# Patient Record
Sex: FEMALE | Race: WHITE | NOT HISPANIC OR LATINO | Employment: UNEMPLOYED | URBAN - METROPOLITAN AREA
[De-identification: names, ages, dates, MRNs, and addresses within clinical notes are randomized per-mention and may not be internally consistent; named-entity substitution may affect disease eponyms.]

---

## 2019-08-29 PROBLEM — M48.062 SPINAL STENOSIS OF LUMBAR REGION WITH NEUROGENIC CLAUDICATION: Status: ACTIVE | Noted: 2019-08-29

## 2019-08-29 PROBLEM — H15.003 BILATERAL SCLERITIS: Status: ACTIVE | Noted: 2019-08-29

## 2019-08-29 PROBLEM — M94.8X9 CHONDRITIS: Status: ACTIVE | Noted: 2019-08-29

## 2019-08-29 PROBLEM — I10 ESSENTIAL HYPERTENSION: Status: ACTIVE | Noted: 2019-08-29

## 2022-01-27 PROBLEM — H60.339 SWIMMER'S EAR: Status: ACTIVE | Noted: 2022-01-27

## 2023-04-03 PROBLEM — J02.9 PHARYNGITIS: Status: ACTIVE | Noted: 2023-04-03

## 2023-05-15 PROBLEM — I71.21 ANEURYSM OF ASCENDING AORTA WITHOUT RUPTURE: Status: ACTIVE | Noted: 2023-05-15

## 2023-05-19 ENCOUNTER — TELEPHONE (OUTPATIENT)
Dept: CARDIOTHORACIC SURGERY | Facility: CLINIC | Age: 68
End: 2023-05-19
Payer: MEDICARE

## 2023-05-19 DIAGNOSIS — I71.21 ANEURYSM OF ASCENDING AORTA WITHOUT RUPTURE: Primary | ICD-10-CM

## 2023-05-19 NOTE — TELEPHONE ENCOUNTER
Called pt RE: referral for TAA.  Pt scheduled to see Dr. Gregg on 5/22.  Pt notified of appt time and location, which she verbalized understanding to.      ----- Message from Tere Webb RN sent at 5/19/2023 10:41 AM CDT -----  Regarding: FW: New Patient  Contact: Patient    ----- Message -----  From: Rylie Vila  Sent: 5/19/2023  10:39 AM CDT  To: [facility] Thoracic Surgery Clinical Support  Subject: New Patient                                      A referral was submitted by Dr. Saab for patient to schedule appt with dept   Patient is requesting a call back to schedule as soon as possible   Please Assist       Patient can be reached at [phone]

## 2023-05-22 ENCOUNTER — OFFICE VISIT (OUTPATIENT)
Dept: CARDIOTHORACIC SURGERY | Facility: CLINIC | Age: 68
End: 2023-05-22
Payer: MEDICARE

## 2023-05-22 VITALS
HEART RATE: 69 BPM | DIASTOLIC BLOOD PRESSURE: 71 MMHG | WEIGHT: 164 LBS | HEIGHT: 64 IN | OXYGEN SATURATION: 99 % | BODY MASS INDEX: 28 KG/M2 | SYSTOLIC BLOOD PRESSURE: 116 MMHG

## 2023-05-22 DIAGNOSIS — I71.21 ANEURYSM OF ASCENDING AORTA WITHOUT RUPTURE: ICD-10-CM

## 2023-05-22 PROCEDURE — 99999 PR PBB SHADOW E&M-EST. PATIENT-LVL III: CPT | Mod: PBBFAC,,, | Performed by: THORACIC SURGERY (CARDIOTHORACIC VASCULAR SURGERY)

## 2023-05-22 PROCEDURE — 99205 OFFICE O/P NEW HI 60 MIN: CPT | Mod: S$GLB,,, | Performed by: THORACIC SURGERY (CARDIOTHORACIC VASCULAR SURGERY)

## 2023-05-22 PROCEDURE — 99999 PR PBB SHADOW E&M-EST. PATIENT-LVL III: ICD-10-PCS | Mod: PBBFAC,,, | Performed by: THORACIC SURGERY (CARDIOTHORACIC VASCULAR SURGERY)

## 2023-05-22 PROCEDURE — 99205 PR OFFICE/OUTPT VISIT, NEW, LEVL V, 60-74 MIN: ICD-10-PCS | Mod: S$GLB,,, | Performed by: THORACIC SURGERY (CARDIOTHORACIC VASCULAR SURGERY)

## 2023-05-22 RX ORDER — DIFLUPREDNATE OPHTHALMIC 0.5 MG/ML
1 EMULSION OPHTHALMIC
COMMUNITY

## 2023-05-22 RX ORDER — DORZOLAMIDE HCL 20 MG/ML
SOLUTION/ DROPS OPHTHALMIC
COMMUNITY
Start: 2022-11-30

## 2023-05-22 RX ORDER — BRIMONIDINE TARTRATE AND TIMOLOL MALEATE 2; 5 MG/ML; MG/ML
1 SOLUTION OPHTHALMIC
COMMUNITY

## 2023-05-22 NOTE — PROGRESS NOTES
Subjective:      Patient ID: Ginger Xiong is a 67 y.o. female.    Chief Complaint: No chief complaint on file.      HPI:  Ginger Xiong is a 67 y.o. female who presents for surgical evaluation of TAA. Medical conditions include HTN, recurrent laryngeal nerve paralysis and hoarseness. Patient states that about 3 months ago she developed hoarseness for which she was sent to ENT. Reports the first ENT did a CT scan of her neck and told her everything looked normal. Saw another ENT who imaged her chest and noted the TAA. He suspected that this was compressing her laryngeal nerve causing the hoarseness. Patient reports that her voice today is still hoarse to her. Says that in the morning she can only speak in a whisper but throughout the day it gets stronger. No chest pain, shortness of breath. Her pressure has been elevated for which she was started on medication. No family history of aneurysm that she knows of. No prior strokes, seizures, blood clots, stents, or sternotomies. Quit smoking around 7 years ago. Prior to that smoked for at least 30 years.       Family and social history reviewed    Current Outpatient Medications   Medication Instructions    amitriptyline (ELAVIL) 50 mg, Oral, Nightly    bisoprolol (ZEBETA) 20 mg, Oral, Daily    brimonidine-timoloL (COMBIGAN) 0.2-0.5 % Drop 1 drop, Both Eyes    difluprednate (DUREZOL) 0.05 % Drop ophthalmic solution 1 drop    dorzolamide (TRUSOPT) 2 % ophthalmic solution INSTILL 1 DROP TWICE A DAY IN BOTH EYES    DULoxetine (CYMBALTA) 60 mg, Oral, Daily    estradioL (ESTRACE) 2 mg, Oral, Daily    methocarbamoL (ROBAXIN) 750 mg, Oral, 3 times daily    propranoloL (INDERAL LA) 160 mg, Oral, Daily         Review of patient's allergies indicates:   Allergen Reactions    Aspirin Anaphylaxis     Past Medical History:   Diagnosis Date    Dry eyes     Dry mouth      Past Surgical History:   Procedure Laterality Date    BACK SURGERY      scoliosis; 2000,2007,2011    HYSTERECTOMY       prolapsed uterus    LASIK       Family History       Problem Relation (Age of Onset)    Cancer Mother (49), Father, Sister          Social History     Socioeconomic History    Marital status:     Number of children: 2   Occupational History    Occupation: retired      Employer:  NOT EMPLOYED   Tobacco Use    Smoking status: Former     Packs/day: 1.00     Years: 30.00     Pack years: 30.00     Types: Cigarettes    Smokeless tobacco: Never    Tobacco comments:     quit smoking 2 years ago    Substance and Sexual Activity    Alcohol use: Yes     Comment: rare    Drug use: No       Current medications Reviewed    Review of Systems   Constitutional:  Negative for activity change and fatigue.   HENT:  Positive for voice change.    Eyes:  Negative for visual disturbance.   Respiratory:  Negative for shortness of breath.    Cardiovascular:  Negative for chest pain.   Gastrointestinal:  Negative for nausea.   Musculoskeletal:  Negative for gait problem.   Skin:  Negative for color change.   Neurological:  Negative for seizures and weakness.   Hematological:  Does not bruise/bleed easily.   Psychiatric/Behavioral:  Negative for sleep disturbance.    Objective:   Physical Exam  Constitutional:       General: She is not in acute distress.  HENT:      Head: Normocephalic and atraumatic.   Eyes:      Pupils: Pupils are equal, round, and reactive to light.   Cardiovascular:      Rate and Rhythm: Normal rate.   Pulmonary:      Effort: Pulmonary effort is normal. No respiratory distress.   Abdominal:      General: There is no distension.   Musculoskeletal:         General: Normal range of motion.      Cervical back: Normal range of motion.   Skin:     Coloration: Skin is not pale.   Neurological:      General: No focal deficit present.      Mental Status: She is alert.   Psychiatric:         Mood and Affect: Mood normal.         Behavior: Behavior normal.       Diagnostic Results:   CT reviewed      Assessment:   TAA ~4.2 cm  Plan:   Patient was seen and assessed or ascending aortic aneurysm.  The ascending aorta appears to be between 4.1-4.2 cm.  The concern from the reference position was that this could be contributing to laryngeal dysfunction secondary to nerve compression.  It appears that the ascending aorta is dilated however it is not to a stage where it would be causing any of the symptoms.  Of note, patient was has already improved without doing any surgical or medical intervention at this stage.  I feel that the ascending aorta is a incidental finding and not contributory to the recurrent laryngeal nerve paralysis.  However, we would recommend a detailed ENT and neurology workup to ascertain the real cause of this problem.  If workup shows that ascending aorta is of concern then we will address it at that stage.  Plan was discussed with the patient who stated an understanding of plan.  All questions and concerns were addressed to their satisfaction.

## 2023-06-06 DIAGNOSIS — R49.0 HOARSENESS: Primary | ICD-10-CM

## 2023-06-07 ENCOUNTER — PATIENT MESSAGE (OUTPATIENT)
Dept: OTOLARYNGOLOGY | Facility: CLINIC | Age: 68
End: 2023-06-07
Payer: MEDICARE

## 2023-06-08 ENCOUNTER — TELEPHONE (OUTPATIENT)
Dept: NEUROLOGY | Facility: CLINIC | Age: 68
End: 2023-06-08
Payer: MEDICARE

## 2023-06-08 DIAGNOSIS — R49.0 HOARSENESS: Primary | ICD-10-CM

## 2023-06-08 NOTE — TELEPHONE ENCOUNTER
Called patient to schedule appointment. Patient declined appointment, states [place] is too far for her.

## 2023-06-08 NOTE — TELEPHONE ENCOUNTER
----- Message from Yanira Stovall RN sent at 6/8/2023  2:20 PM CDT -----  Good afternoon,    Dr. Gregg recently saw Mrs. Ginger Xiong for thoracic ascending aortic aneurysm and she c/o hoarseness.  Dr. Gregg has referred Mrs. Xiong to Neurology related to a concern for laryngeal dysfunction secondary to nerve compression.  Can you please contact Mrs. Xiong and schedule her to see a Neurologist for this?    Thank you,    Yanira Stovall RN

## 2023-06-09 ENCOUNTER — TELEPHONE (OUTPATIENT)
Dept: NEUROLOGY | Facility: CLINIC | Age: 68
End: 2023-06-09
Payer: MEDICARE

## 2023-06-09 ENCOUNTER — PATIENT MESSAGE (OUTPATIENT)
Dept: NEUROLOGY | Facility: CLINIC | Age: 68
End: 2023-06-09
Payer: MEDICARE

## 2023-06-16 ENCOUNTER — TELEPHONE (OUTPATIENT)
Dept: NEUROLOGY | Facility: CLINIC | Age: 68
End: 2023-06-16
Payer: COMMERCIAL

## 2023-06-16 ENCOUNTER — PATIENT MESSAGE (OUTPATIENT)
Dept: NEUROLOGY | Facility: CLINIC | Age: 68
End: 2023-06-16
Payer: COMMERCIAL

## 2023-06-16 NOTE — TELEPHONE ENCOUNTER
----- Message from Yanira Stovall RN sent at 6/13/2023  8:34 AM CDT -----  Seun Menendez,    Just out of curiosity: Mrs. Xiong is coming to the [facility] next Tuesday (June 20) to see Dr. Waggoner in ENT.  Are there any available appts for her to see a provider in Neurology at [facility] that day?    Thank you,    Yanira  ----- Message -----  From: Gemma Mcwilliams RN  Sent: 6/9/2023   2:44 PM CDT  To: Yanira Stovall RN    [facility] [place] [facility] but she declined NS appt. Not sure if she will come to [facility]  ----- Message -----  From: Yanira Stovall RN  Sent: 6/8/2023   2:35 PM CDT  To: [facility] Neurology Clinical Support, #    Good afternoon,    Dr. Gregg recently saw Mrs. Ginger Xiong for thoracic ascending aortic aneurysm and she c/o hoarseness.  Dr. Gregg has referred Mrs. Xiong to Neurology related to a concern for laryngeal dysfunction secondary to nerve compression.  Can you please contact Mrs. Xiong and schedule her to see a Neurologist for this?    Thank you,    Yanira Stovall RN

## 2023-06-16 NOTE — TELEPHONE ENCOUNTER
LVM for the patient. Offered next available appointment Tuesday after ENT appt at 2 pm with Dr Caruso. Left direct contact information and sent portal message as well.

## 2023-06-19 ENCOUNTER — TELEPHONE (OUTPATIENT)
Dept: CARDIOTHORACIC SURGERY | Facility: CLINIC | Age: 68
End: 2023-06-19
Payer: COMMERCIAL

## 2023-06-19 NOTE — TELEPHONE ENCOUNTER
Called and confirmed pt is aware of ENT and Neurology appts scheduled for tomorrow for hoarseness, which pt was referred by Dr. Gregg.  Pt informed of appt times and locations, which she verbalized understanding to.

## 2023-06-20 ENCOUNTER — LAB VISIT (OUTPATIENT)
Dept: LAB | Facility: HOSPITAL | Age: 68
End: 2023-06-20
Attending: PSYCHIATRY & NEUROLOGY
Payer: COMMERCIAL

## 2023-06-20 ENCOUNTER — OFFICE VISIT (OUTPATIENT)
Dept: OTOLARYNGOLOGY | Facility: CLINIC | Age: 68
End: 2023-06-20
Payer: COMMERCIAL

## 2023-06-20 ENCOUNTER — OFFICE VISIT (OUTPATIENT)
Dept: NEUROLOGY | Facility: CLINIC | Age: 68
End: 2023-06-20
Payer: COMMERCIAL

## 2023-06-20 VITALS
SYSTOLIC BLOOD PRESSURE: 119 MMHG | HEART RATE: 64 BPM | BODY MASS INDEX: 28.24 KG/M2 | DIASTOLIC BLOOD PRESSURE: 79 MMHG | WEIGHT: 165.44 LBS | HEIGHT: 64 IN

## 2023-06-20 VITALS
DIASTOLIC BLOOD PRESSURE: 79 MMHG | HEART RATE: 75 BPM | HEIGHT: 64 IN | BODY MASS INDEX: 28.12 KG/M2 | SYSTOLIC BLOOD PRESSURE: 116 MMHG | WEIGHT: 164.69 LBS

## 2023-06-20 DIAGNOSIS — R49.0 HOARSENESS: ICD-10-CM

## 2023-06-20 DIAGNOSIS — G62.9 POLYNEUROPATHY: ICD-10-CM

## 2023-06-20 DIAGNOSIS — G62.9 POLYNEUROPATHY: Primary | ICD-10-CM

## 2023-06-20 LAB
CRP SERPL-MCNC: 83 MG/L (ref 0–8.2)
ERYTHROCYTE [SEDIMENTATION RATE] IN BLOOD BY PHOTOMETRIC METHOD: 30 MM/HR (ref 0–36)
ESTIMATED AVG GLUCOSE: 123 MG/DL (ref 68–131)
HBA1C MFR BLD: 5.9 % (ref 4–5.6)

## 2023-06-20 PROCEDURE — 83921 ORGANIC ACID SINGLE QUANT: CPT | Performed by: PSYCHIATRY & NEUROLOGY

## 2023-06-20 PROCEDURE — 3008F BODY MASS INDEX DOCD: CPT | Mod: CPTII,S$GLB,, | Performed by: PSYCHIATRY & NEUROLOGY

## 2023-06-20 PROCEDURE — 3078F PR MOST RECENT DIASTOLIC BLOOD PRESSURE < 80 MM HG: ICD-10-PCS | Mod: CPTII,S$GLB,, | Performed by: PSYCHIATRY & NEUROLOGY

## 2023-06-20 PROCEDURE — 3288F FALL RISK ASSESSMENT DOCD: CPT | Mod: CPTII,S$GLB,, | Performed by: PSYCHIATRY & NEUROLOGY

## 2023-06-20 PROCEDURE — 3074F PR MOST RECENT SYSTOLIC BLOOD PRESSURE < 130 MM HG: ICD-10-PCS | Mod: CPTII,S$GLB,, | Performed by: OTOLARYNGOLOGY

## 2023-06-20 PROCEDURE — 3074F SYST BP LT 130 MM HG: CPT | Mod: CPTII,S$GLB,, | Performed by: PSYCHIATRY & NEUROLOGY

## 2023-06-20 PROCEDURE — 1126F AMNT PAIN NOTED NONE PRSNT: CPT | Mod: CPTII,S$GLB,, | Performed by: OTOLARYNGOLOGY

## 2023-06-20 PROCEDURE — 3008F PR BODY MASS INDEX (BMI) DOCUMENTED: ICD-10-PCS | Mod: CPTII,S$GLB,, | Performed by: OTOLARYNGOLOGY

## 2023-06-20 PROCEDURE — 1101F PT FALLS ASSESS-DOCD LE1/YR: CPT | Mod: CPTII,S$GLB,, | Performed by: PSYCHIATRY & NEUROLOGY

## 2023-06-20 PROCEDURE — 99205 OFFICE O/P NEW HI 60 MIN: CPT | Mod: S$GLB,,, | Performed by: PSYCHIATRY & NEUROLOGY

## 2023-06-20 PROCEDURE — 99999 PR PBB SHADOW E&M-EST. PATIENT-LVL IV: ICD-10-PCS | Mod: PBBFAC,,, | Performed by: PSYCHIATRY & NEUROLOGY

## 2023-06-20 PROCEDURE — 86140 C-REACTIVE PROTEIN: CPT | Performed by: PSYCHIATRY & NEUROLOGY

## 2023-06-20 PROCEDURE — 31575 DIAGNOSTIC LARYNGOSCOPY: CPT | Mod: S$GLB,,, | Performed by: OTOLARYNGOLOGY

## 2023-06-20 PROCEDURE — 1125F PR PAIN SEVERITY QUANTIFIED, PAIN PRESENT: ICD-10-PCS | Mod: CPTII,S$GLB,, | Performed by: PSYCHIATRY & NEUROLOGY

## 2023-06-20 PROCEDURE — 3074F PR MOST RECENT SYSTOLIC BLOOD PRESSURE < 130 MM HG: ICD-10-PCS | Mod: CPTII,S$GLB,, | Performed by: PSYCHIATRY & NEUROLOGY

## 2023-06-20 PROCEDURE — 85652 RBC SED RATE AUTOMATED: CPT | Performed by: PSYCHIATRY & NEUROLOGY

## 2023-06-20 PROCEDURE — 1160F RVW MEDS BY RX/DR IN RCRD: CPT | Mod: CPTII,S$GLB,, | Performed by: PSYCHIATRY & NEUROLOGY

## 2023-06-20 PROCEDURE — 3044F PR MOST RECENT HEMOGLOBIN A1C LEVEL <7.0%: ICD-10-PCS | Mod: CPTII,S$GLB,, | Performed by: OTOLARYNGOLOGY

## 2023-06-20 PROCEDURE — 99204 OFFICE O/P NEW MOD 45 MIN: CPT | Mod: 25,S$GLB,, | Performed by: OTOLARYNGOLOGY

## 2023-06-20 PROCEDURE — 99999 PR PBB SHADOW E&M-EST. PATIENT-LVL IV: CPT | Mod: PBBFAC,,, | Performed by: PSYCHIATRY & NEUROLOGY

## 2023-06-20 PROCEDURE — 3074F SYST BP LT 130 MM HG: CPT | Mod: CPTII,S$GLB,, | Performed by: OTOLARYNGOLOGY

## 2023-06-20 PROCEDURE — 3008F PR BODY MASS INDEX (BMI) DOCUMENTED: ICD-10-PCS | Mod: CPTII,S$GLB,, | Performed by: PSYCHIATRY & NEUROLOGY

## 2023-06-20 PROCEDURE — 1126F PR PAIN SEVERITY QUANTIFIED, NO PAIN PRESENT: ICD-10-PCS | Mod: CPTII,S$GLB,, | Performed by: OTOLARYNGOLOGY

## 2023-06-20 PROCEDURE — 99999 PR PBB SHADOW E&M-EST. PATIENT-LVL IV: CPT | Mod: PBBFAC,,, | Performed by: OTOLARYNGOLOGY

## 2023-06-20 PROCEDURE — 99205 PR OFFICE/OUTPT VISIT, NEW, LEVL V, 60-74 MIN: ICD-10-PCS | Mod: S$GLB,,, | Performed by: PSYCHIATRY & NEUROLOGY

## 2023-06-20 PROCEDURE — 1101F PR PT FALLS ASSESS DOC 0-1 FALLS W/OUT INJ PAST YR: ICD-10-PCS | Mod: CPTII,S$GLB,, | Performed by: PSYCHIATRY & NEUROLOGY

## 2023-06-20 PROCEDURE — 82607 VITAMIN B-12: CPT | Performed by: PSYCHIATRY & NEUROLOGY

## 2023-06-20 PROCEDURE — 31575 PR LARYNGOSCOPY, FLEXIBLE; DIAGNOSTIC: ICD-10-PCS | Mod: S$GLB,,, | Performed by: OTOLARYNGOLOGY

## 2023-06-20 PROCEDURE — 99204 PR OFFICE/OUTPT VISIT, NEW, LEVL IV, 45-59 MIN: ICD-10-PCS | Mod: 25,S$GLB,, | Performed by: OTOLARYNGOLOGY

## 2023-06-20 PROCEDURE — 36415 COLL VENOUS BLD VENIPUNCTURE: CPT | Performed by: PSYCHIATRY & NEUROLOGY

## 2023-06-20 PROCEDURE — 83036 HEMOGLOBIN GLYCOSYLATED A1C: CPT | Performed by: PSYCHIATRY & NEUROLOGY

## 2023-06-20 PROCEDURE — 1125F AMNT PAIN NOTED PAIN PRSNT: CPT | Mod: CPTII,S$GLB,, | Performed by: PSYCHIATRY & NEUROLOGY

## 2023-06-20 PROCEDURE — 3078F PR MOST RECENT DIASTOLIC BLOOD PRESSURE < 80 MM HG: ICD-10-PCS | Mod: CPTII,S$GLB,, | Performed by: OTOLARYNGOLOGY

## 2023-06-20 PROCEDURE — 1160F PR REVIEW ALL MEDS BY PRESCRIBER/CLIN PHARMACIST DOCUMENTED: ICD-10-PCS | Mod: CPTII,S$GLB,, | Performed by: PSYCHIATRY & NEUROLOGY

## 2023-06-20 PROCEDURE — 3288F PR FALLS RISK ASSESSMENT DOCUMENTED: ICD-10-PCS | Mod: CPTII,S$GLB,, | Performed by: PSYCHIATRY & NEUROLOGY

## 2023-06-20 PROCEDURE — 3008F BODY MASS INDEX DOCD: CPT | Mod: CPTII,S$GLB,, | Performed by: OTOLARYNGOLOGY

## 2023-06-20 PROCEDURE — 3078F DIAST BP <80 MM HG: CPT | Mod: CPTII,S$GLB,, | Performed by: OTOLARYNGOLOGY

## 2023-06-20 PROCEDURE — 86038 ANTINUCLEAR ANTIBODIES: CPT | Performed by: PSYCHIATRY & NEUROLOGY

## 2023-06-20 PROCEDURE — 99999 PR PBB SHADOW E&M-EST. PATIENT-LVL IV: ICD-10-PCS | Mod: PBBFAC,,, | Performed by: OTOLARYNGOLOGY

## 2023-06-20 PROCEDURE — 1159F PR MEDICATION LIST DOCUMENTED IN MEDICAL RECORD: ICD-10-PCS | Mod: CPTII,S$GLB,, | Performed by: PSYCHIATRY & NEUROLOGY

## 2023-06-20 PROCEDURE — 3078F DIAST BP <80 MM HG: CPT | Mod: CPTII,S$GLB,, | Performed by: PSYCHIATRY & NEUROLOGY

## 2023-06-20 PROCEDURE — 3044F HG A1C LEVEL LT 7.0%: CPT | Mod: CPTII,S$GLB,, | Performed by: OTOLARYNGOLOGY

## 2023-06-20 PROCEDURE — 1159F MED LIST DOCD IN RCRD: CPT | Mod: CPTII,S$GLB,, | Performed by: PSYCHIATRY & NEUROLOGY

## 2023-06-20 RX ORDER — PREDNISOLONE SODIUM PHOSPHATE 10 MG/ML
1 SOLUTION/ DROPS OPHTHALMIC
COMMUNITY
Start: 2021-08-18

## 2023-06-20 RX ORDER — LOTEPREDNOL ETABONATE 5 MG/ML
1 SUSPENSION/ DROPS OPHTHALMIC
COMMUNITY
Start: 2023-06-13

## 2023-06-20 RX ORDER — MONTELUKAST SODIUM 10 MG/1
10 TABLET ORAL
COMMUNITY
Start: 2023-06-13 | End: 2023-07-17 | Stop reason: SDUPTHER

## 2023-06-20 NOTE — PROGRESS NOTES
[facility]    Subjective:       Patient ID: Ginger Xiong is a 68 y.o. female.    Chief Complaint: Hoarse    HPI    Ginger Xiong is a 68 y.o. female who was referred by Dr. 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 He had met her recently to evaluate a recently discovered, small thoracic aortic aneurysm. She reports sudden and complete loss of her voice on March 10 in the setting of neck pain. She improved to a whisper voice, but experienced a return to no voice. She visited with Dr. Nunn who noted a left vocal fold hypomobility and obtained a CT of her neck that was normal. When she did not get better, she met with Dr. Rivas, who informed her that the other vocal fold was weak. So he obtained a CT of the chest that identified the TAA.  With the TAA in the setting of a higher than normal HR and BP, she was referred to Dr. Gregg. The TAA was noted to be small and below the threshold of what has been previously associated with RLN issues, but he requested an evaluation and perhaps a tie breaker, given the differing assessments or remembrances of which TVF had a mobility issue.     Today, she reports that her voice is basically back to normal. She has no dysphagia, odynophagia, aspiration, recent pneumonias, coughing with eating and drinking, and ear pain. She is breathing well. She denies URI symptoms or sick contacts at the time of her symptom onset.    She did have an ACDF back in 2011, and she notes that she did not experience voice changes then. 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     She presents for evaluation and management.     Past Medical History:   Diagnosis Date    Dry eyes     Dry mouth      Past Surgical History:   Procedure Laterality Date    BACK SURGERY      scoliosis; 2000,2007,2011    HYSTERECTOMY      prolapsed uterus    LASIK           Current Outpatient Medications:     amitriptyline (ELAVIL) 50 MG  tablet, Take 1 tablet (50 mg total) by mouth every evening., Disp: 90 tablet, Rfl: 3    bisoprolol (ZEBETA) 10 MG tablet, Take 2 tablets (20 mg total) by mouth once daily., Disp: 30 tablet, Rfl: 11    brimonidine-timoloL (COMBIGAN) 0.2-0.5 % Drop, Place 1 drop into both eyes., Disp: , Rfl:     difluprednate (DUREZOL) 0.05 % Drop ophthalmic solution, 1 drop., Disp: , Rfl:     dorzolamide (TRUSOPT) 2 % ophthalmic solution, INSTILL 1 DROP TWICE A DAY IN BOTH EYES, Disp: , Rfl:     DULoxetine (CYMBALTA) 60 MG capsule, Take 1 capsule (60 mg total) by mouth once daily. (Patient not taking: Reported on 6/20/2023), Disp: 90 capsule, Rfl: 2    estradioL (ESTRACE) 2 MG tablet, Take 1 tablet (2 mg total) by mouth once daily., Disp: 30 tablet, Rfl: 4    loteprednol (LOTEMAX) 0.5 % ophthalmic suspension, Place 1 drop into both eyes., Disp: , Rfl:     methocarbamoL (ROBAXIN) 750 MG Tab, Take 1 tablet (750 mg total) by mouth 3 (three) times daily., Disp: 90 tablet, Rfl: 2    montelukast (SINGULAIR) 10 mg tablet, Take 10 mg by mouth., Disp: , Rfl:     prednisoLONE sodium phosphate (INFLAMASE FORTE) 1 % Drop, 1 drop., Disp: , Rfl:     propranoloL (INDERAL LA) 160 mg 24 hr capsule, Take 1 capsule (160 mg total) by mouth once daily., Disp: 90 capsule, Rfl: 3    Review of patient's allergies indicates:   Allergen Reactions    Aspirin Anaphylaxis    Imitrex [sumatriptan] Palpitations and Other (See Comments)     Severe pain and palpitations       Social History     Socioeconomic History    Marital status:     Number of children: 2   Occupational History    Occupation: retired      Employer:  NOT EMPLOYED   Tobacco Use    Smoking status: Former     Packs/day: 1.00     Years: 30.00     Pack years: 30.00     Types: Cigarettes    Smokeless tobacco: Never    Tobacco comments:     quit smoking 2 years ago    Substance and Sexual Activity    Alcohol use: Yes     Comment: rare    Drug use: No       Family History   Problem  Relation Age of Onset    Cancer Mother 49        pancreatic    Cancer Father         lung    Cancer Sister         breast       Review of Systems   Constitutional: Negative.    HENT:  Positive for voice change.    Respiratory: Negative.     Cardiovascular: Negative.    Gastrointestinal: Negative.    Endocrine: Negative.    Genitourinary: Negative.    Musculoskeletal:  Positive for back pain and neck pain.   Skin: Negative.    Allergic/Immunologic: Negative.    Neurological:  Positive for headaches.   Hematological:  Bruises/bleeds easily.   Psychiatric/Behavioral: Negative.         Answers submitted by the patient for this visit:  Review of Symptoms Questionnaire  (Submitted on 6/18/2023)    Objective:     Physical Exam  Vitals reviewed.   Constitutional:       General: She is not in acute distress.     Appearance: She is well-developed.   HENT:      Head: Normocephalic and atraumatic.      Jaw: No trismus.      Salivary Glands: Right salivary gland is not diffusely enlarged or tender. Left salivary gland is not diffusely enlarged or tender.      Comments: Salivary glands - there are no lesions or asymmetric findings in the submandibular or parotid glands     Right Ear: Hearing, tympanic membrane, ear canal and external ear normal.      Left Ear: Hearing, tympanic membrane, ear canal and external ear normal.      Nose: No nasal deformity or rhinorrhea.      Mouth/Throat:      Mouth: No oral lesions.      Tongue: No lesions.      Palate: No mass and lesions.      Pharynx: Uvula midline.      Comments: Procedure: Flexible laryngoscopy  In order to fully examine the upper aerodigestive tract, including the larynx, in a patient with a hyperactive gag reflex, flexible endoscopy is required.  After explaining the procedure and obtaining verbal consent, a timeout was performed with the patient's participation according to the universal protocol. Both nasal cavities were anesthetized with 4% Xylocaine spray mixed with  Arturo-Synephrine. The flexible laryngoscope (#[id number]) was inserted into the nasal cavity and advanced to visualize the nasal cavity, nasopharynx, the posterior oropharynx, hypopharynx, and the endolarynx with the above findings noted. The scope was removed and the procedure terminated. The patient tolerated this procedure well without apparent complication.      FINDINGS  Nasopharynx - the torus is clear. There are no lesions of the posterior wall.   Oropharynx - no lesions of the tongue base. There is no obvious fullness or asymmetry.  Hypopharynx - there are no lesions of the pyriform sinuses or postcricoid region   Larynx - there are no lesions of the supraglottic or glottic larynx. Vocal fold mobility is normal with complete closure. There is no vocal fold mobility issue     Eyes:      General: Lids are normal.      Extraocular Movements: Extraocular movements intact.      Conjunctiva/sclera:      Right eye: Right conjunctiva is not injected. No chemosis.     Left eye: Left conjunctiva is not injected. No chemosis.  Neck:      Thyroid: No thyroid mass or thyromegaly.      Vascular: No JVD.      Trachea: Phonation normal. No tracheal deviation.   Pulmonary:      Effort: Pulmonary effort is normal. No accessory muscle usage or respiratory distress.      Breath sounds: No stridor.   Musculoskeletal:         General: No tenderness.      Cervical back: Full passive range of motion without pain.   Lymphadenopathy:      Cervical: No cervical adenopathy.      Right cervical: No deep or posterior cervical adenopathy.     Left cervical: No deep or posterior cervical adenopathy.   Skin:     Findings: No erythema, lesion or rash.      Nails: There is no clubbing.   Neurological:      Mental Status: She is alert and oriented to person, place, and time.      Cranial Nerves: No cranial nerve deficit or facial asymmetry.      Motor: No weakness.      Gait: Gait normal.   Psychiatric:         Speech: Speech normal.          Behavior: Behavior is cooperative.              Assessment & Plan:       Problem List Items Addressed This Visit    None  Visit Diagnoses       Hoarseness        Her vocal fold mobility is now normal. There is no paresis/paralysis, and there are no lesions. Reassurance was provided. FU PRN        We had a discussion that it is quite possible that she had an idiopathic vocal fold paralysis at her initial presentation, and it was improving when she met with the next team. But it has resolved now. I would not expect a vocal fold immobility issue that was related to a TAA to improve. She may return to see me as needed.

## 2023-06-20 NOTE — PROGRESS NOTES
[facility] - NEUROLOGY 7TH [place] [facility], [place]    Date: 6/20/23  Patient Name: Ginger Xiong   MRN: [id number]   Referring Provider: Donovan Gregg MD    Thank you so much Donovan Gregg MD for your patient referral to Neuromuscular team at [facility]. We take pride in our care coordination and look forward to your feedback and questions.    Assessment:   Ginger Xiong is a 68 y.o. female is a very pleasant patient with hoarseness of voice with evidence of left vocal cord and laryngeal muscle edema which progressively improved to almost her baseline.  I wonder if she had some viral infection related laryngitis.  The differential may include vasculitic event in the setting of autoimmune problem.    She additionally has evidence of polyneuropathy I will complete the workup for polyneuropathy.    I discussed about fall precautions and need for Physiotherapy. I addressed her complaints. I provided information about fall precautions and healthy lifestyle.    I would wish her very best for improvement/recovery in her condition.    Future direction based on feedback:    Plan:     Problem List Items Addressed This Visit    None  Visit Diagnoses       Polyneuropathy    -  Primary    Relevant Orders    Vitamin B12 Deficiency Panel    HEMOGLOBIN A1C    Sedimentation rate    C-REACTIVE PROTEIN    LOPEZ by IFA, w/Rflx    Hoarseness        Relevant Orders    HEMOGLOBIN A1C    Sedimentation rate    C-REACTIVE PROTEIN    LOPEZ by IFA, w/Rflx            Toy Caruso MD    This evaluation was completed in >60  Minutes over 50% of the time spent on education & counseling. This includes face to face time and non-face to face time preparing to see the patient (eg, review of tests), obtaining and/or reviewing separately obtained history, documenting clinical information in the electronic or other health record, independently interpreting results and communicating results to the  patient/family/caregiver, or care coordinator.    Patient note was created using MModal Dictation.  Any errors in syntax or even information may not have been identified and edited on initial review prior to signing this note.    Details provided by:    Patient  Family- [place] ()    Chief complaint today:  Hoarseness of voice    History of Present Illness:      Ms. Ginger Xiong is a 68 y.o. female presenting for evaluation of  Hoarseness of voice.  She has PMH of HTN, aneurysm of ascending aorta.    She noticed hoarseness of voice for more than 3 months.  It peaked after 6 weeks of its onset to the point that she was not having any voice at all. Since the peak it started improving gradually but there was some fluctuation and there were some good days and some bad days.  She was tested for COVID twice during that time but result was negative.  Currently she feels she is 90 percent back to her usual self.  She did not notice any other symptoms of swallowing or breathing difficulty.      She noted vibration like sensation in legs for past 12 years.  She had 3 back surgeries in the past.  There is no history of numbness in lower extremities. She develop pneumonia in 2019 in early COVID.      There was concern for autoimmune disorder in the past and she is currently following with a rheumatologist Dr. Alexander in [place].  She has noted the symptoms for past 4 years mainly concerning her eyes and she has been tried on methotrexate, IVIG, Humira, Remicade for probable lupus.   She is currently retired but worked as  in the past.  There is no history of smoking and she rarely drinks alcohol.    Chart Review:  She was seen by ENT on 3rd April 2023 and there was concern for partial vocal cord paralysis.  Fiberoptic flexible laryngoscopy was done with sluggish left vocal cord adduction, arytenoid edema, arytenoid erythema and interarytenoid edema.    Pertinent work up based on chart review for current  condition:  CT scan of soft tissue neck done on 04/18/2023.  No evidence of mass lesion, adenopathy or any specific abnormality to explain patient's symptoms.  Serum protein electrophoresis normal   C-reactive protein 7.3   Creatinine 0.8.    LFTs normal   TSH 2.5   CBC with WBC 9.0     Last CBC Results:   Lab Results   Component Value Date    WBC 5.92 08/18/2014    HGB 13.5 08/18/2014    HCT 40.0 08/18/2014     08/18/2014       Last CMP Results  Lab Results   Component Value Date     08/18/2014    K 4.5 08/18/2014     08/18/2014    CO2 28 08/18/2014    BUN 11 05/10/2023    CREATININE 0.9 08/18/2014    CALCIUM 9.4 08/18/2014    ALBUMIN 4.0 08/18/2014    AST 37 08/18/2014    ALT 16 08/18/2014       Last Lipids  No results found for: CHOL, TRIG, HDL, LDLCALC    Last A1C  No results found for: HGBA1C    Last TSH  No results found for: TSH    MRI 06/20/2023    Review of Systems:  12 system review of systems is negative except for the symptoms mentioned in HPI.       PAST MEDICAL HISTORY:  Past Medical History:   Diagnosis Date    Dry eyes     Dry mouth        PAST SURGICAL HISTORY:  Past Surgical History:   Procedure Laterality Date    BACK SURGERY      scoliosis; 2000,2007,2011    HYSTERECTOMY      prolapsed uterus    LASIK         CURRENT MEDS:  I have reconciled the patient's home medications and discharge medications with the patient/family. I have updated all changes.  Refer to After-Visit Medication List.    Current Outpatient Medications   Medication Sig Dispense Refill    amitriptyline (ELAVIL) 50 MG tablet Take 1 tablet (50 mg total) by mouth every evening. 90 tablet 3    bisoprolol (ZEBETA) 10 MG tablet Take 2 tablets (20 mg total) by mouth once daily. 30 tablet 11    brimonidine-timoloL (COMBIGAN) 0.2-0.5 % Drop Place 1 drop into both eyes.      difluprednate (DUREZOL) 0.05 % Drop ophthalmic solution 1 drop.      dorzolamide (TRUSOPT) 2 % ophthalmic solution INSTILL 1 DROP TWICE A DAY IN  "BOTH EYES      estradioL (ESTRACE) 2 MG tablet Take 1 tablet (2 mg total) by mouth once daily. 30 tablet 4    loteprednol (LOTEMAX) 0.5 % ophthalmic suspension Place 1 drop into both eyes.      methocarbamoL (ROBAXIN) 750 MG Tab Take 1 tablet (750 mg total) by mouth 3 (three) times daily. 90 tablet 2    montelukast (SINGULAIR) 10 mg tablet Take 10 mg by mouth.      prednisoLONE sodium phosphate (INFLAMASE FORTE) 1 % Drop 1 drop.      propranoloL (INDERAL [place]) 160 mg 24 hr capsule Take 1 capsule (160 mg total) by mouth once daily. 90 capsule 3    DULoxetine (CYMBALTA) 60 MG capsule Take 1 capsule (60 mg total) by mouth once daily. (Patient not taking: Reported on 6/20/2023) 90 capsule 2     No current facility-administered medications for this visit.       ALLERGIES:  Review of patient's allergies indicates:   Allergen Reactions    Aspirin Anaphylaxis    Imitrex [sumatriptan] Palpitations and Other (See Comments)     Severe pain and palpitations       FAMILY HISTORY:  Family History   Problem Relation Age of Onset    Cancer Mother 49        pancreatic    Cancer Father         lung    Cancer Sister         breast       SOCIAL HISTORY:  Social History     Tobacco Use    Smoking status: Former     Packs/day: 1.00     Years: 30.00     Pack years: 30.00     Types: Cigarettes    Smokeless tobacco: Never    Tobacco comments:     quit smoking 2 years ago    Substance Use Topics    Alcohol use: Yes     Comment: rare    Drug use: No         Objective:     Vitals:    06/20/23 1408   BP: 119/79   Pulse: 64   Weight: 75 kg (165 lb 7.3 oz)   Height: 5' 4" (1.626 m)     Wt Readings from Last 3 Encounters:   06/20/23 1408 75 kg (165 lb 7.3 oz)   06/20/23 1027 74.7 kg (164 lb 10.9 oz)   05/22/23 1037 74.4 kg (164 lb)     Body mass index is 28.4 kg/m².       Eyes: no tearing, discharge, no erythema   ENT: moist mucous membranes of the oral cavity, nares patent    Neck: Supple, full range of motion  Cardiovascular: Warm and well " perfused, pulses equal and symmetrical  Lungs: Normal work of breathing, normal chest wall excursions  Skin: No rash, lesions, or breakdown on exposed skin  Psychiatry: Mood and affect are appropriate   Extremeties: No cyanosis, clubbing or edema.    GENERAL/CONSTITUTIONAL:    -Well appearing; well nourished  -her toes are discolored mainly pinkish but she said it can become purple for past 1 year.    HIGHER INTEGRATIVE FUNCTIONS:   -Attention & concentration: Normal   -Orientation: Oriented to person, place & time  -Memory: Normal  -Language: Normal   -Fund of Knowledge: Normal     CRANIAL NERVES:   -CN 2: Visual fields full  -CN 2,3: PERRL  -CN 3,4,6: EOMI  -CN 5: Facial sensation intact bilaterally  -CN 7: Facial strength/movement intact bilaterally  -CN 8: Hearing normal bilaterally  -CN 9,10: Palate elevates symmetrically  -CN 11: Normal shoulder shrug and head turn  -CN 12: Tongue protrudes midline     MOTOR:   -Tone: normal in upper and lower extremities  -UE/LE motor: 5/5 throughout, no pronator drift     SENSATION:   -Her vibration sense is impaired up to ankles.    -Pinprick is impaired up to knees bilaterally.    REFLEXES:   -2/4 upper and lower extremities bilaterally  -Flexor plantar reflex bilaterally    COORDINATION:   -FNF normal bilaterally    GAIT:   -Normal casual gait. Able to walk on heels and toes without difficulty.    Scheduled Follow-up :  Future Appointments   Date Time Provider Department Center   6/20/2023  3:00 PM LAB, APPOINTMENT [facility]       After Visit Medication List :     Medication List            Accurate as of June 20, 2023  2:53 PM. If you have any questions, ask your nurse or doctor.                CONTINUE taking these medications      amitriptyline 50 MG tablet  Commonly known as: ELAVIL  Take 1 tablet (50 mg total) by mouth every evening.     bisoprolol 10 MG tablet  Commonly known as: ZEBETA  Take 2 tablets (20 mg total) by mouth once daily.      brimonidine-timoloL 0.2-0.5 % Drop  Commonly known as: COMBIGAN     difluprednate 0.05 % Drop ophthalmic solution  Commonly known as: DUREZOL     dorzolamide 2 % ophthalmic solution  Commonly known as: TRUSOPT     DULoxetine 60 MG capsule  Commonly known as: CYMBALTA  Take 1 capsule (60 mg total) by mouth once daily.     estradioL 2 MG tablet  Commonly known as: ESTRACE  Take 1 tablet (2 mg total) by mouth once daily.     loteprednol 0.5 % ophthalmic suspension  Commonly known as: LOTEMAX     methocarbamoL 750 MG Tab  Commonly known as: ROBAXIN  Take 1 tablet (750 mg total) by mouth 3 (three) times daily.     montelukast 10 mg tablet  Commonly known as: SINGULAIR     prednisoLONE sodium phosphate 1 % Drop  Commonly known as: INFLAMASE FORTE     propranoloL 160 mg 24 hr capsule  Commonly known as: INDERAL [place]  Take 1 capsule (160 mg total) by mouth once daily.              Signing Physician:          Toy Caruso MD  , [employer] [facility] [employer]  Neurology Consultant. [facility].   [address] [address]th [place] [address].

## 2023-06-21 LAB
ANA SER-ACNC: NORMAL
VIT B12 SERPL-MCNC: 281 NG/L (ref 180–914)

## 2023-06-23 LAB — METHYLMALONATE SERPL-SCNC: 0.18 NMOL/ML

## 2023-06-26 ENCOUNTER — PATIENT MESSAGE (OUTPATIENT)
Dept: NEUROLOGY | Facility: CLINIC | Age: 68
End: 2023-06-26
Payer: COMMERCIAL

## 2024-05-01 ENCOUNTER — TELEPHONE (OUTPATIENT)
Dept: CARDIOTHORACIC SURGERY | Facility: CLINIC | Age: 69
End: 2024-05-01
Payer: COMMERCIAL

## 2024-05-01 DIAGNOSIS — I71.21 ANEURYSM OF ASCENDING AORTA WITHOUT RUPTURE: Primary | ICD-10-CM

## 2024-05-01 NOTE — TELEPHONE ENCOUNTER
Called pt and left brief VM to give a call back to set up yearly apt with Dr. Gregg.  Portal message was also sent to pt . Contact no was provided.

## 2024-05-02 ENCOUNTER — TELEPHONE (OUTPATIENT)
Dept: CARDIOTHORACIC SURGERY | Facility: CLINIC | Age: 69
End: 2024-05-02
Payer: COMMERCIAL

## 2024-06-06 NOTE — PROGRESS NOTES
"Subjective:      Patient ID: Ginger Xiong is a 68 y.o. female.    Chief Complaint: No chief complaint on file.      HPI:  Ginger Xiong is a 68 y.o. female who presents as a one year follow up for TAA surveillance.  She has a medical history significant for HTN, recurrent laryngeal nerve paralysis and hoarseness. She was last seen in May of 2023 by Dr. Gregg.  At that time, her TAA measured 4.1-4.2 cm.  Per his note"Patient was seen and assessed or ascending aortic aneurysm. The ascending aorta appears to be between 4.1-4.2 cm. The concern from the reference position was that this could be contributing to laryngeal dysfunction secondary to nerve compression. It appears that the ascending aorta is dilated however it is not to a stage where it would be causing any of the symptoms. Of note, patient was has already improved without doing any surgical or medical intervention at this stage. I feel that the ascending aorta is a incidental finding and not contributory to the recurrent laryngeal nerve paralysis. However, we would recommend a detailed ENT and neurology workup to ascertain the real cause of this problem. If workup shows that ascending aorta is of concern then we will address it at that stage."   Today, she reports no changes since her last visit.  She does report good control over her blood pressure at home.  No smoking.       Current Outpatient Medications:     amitriptyline (ELAVIL) 50 MG tablet, Take 1 tablet (50 mg total) by mouth every evening., Disp: 90 tablet, Rfl: 3    bisoprolol (ZEBETA) 10 MG tablet, Take 2 tablets (20 mg total) by mouth once daily., Disp: 180 tablet, Rfl: 11    brimonidine-timoloL (COMBIGAN) 0.2-0.5 % Drop, Place 1 drop into both eyes., Disp: , Rfl:     difluprednate (DUREZOL) 0.05 % Drop ophthalmic solution, 1 drop., Disp: , Rfl:     dorzolamide (TRUSOPT) 2 % ophthalmic solution, INSTILL 1 DROP TWICE A DAY IN BOTH EYES, Disp: , Rfl:     DULoxetine (CYMBALTA) 60 MG capsule, Take " 1 capsule (60 mg total) by mouth once daily., Disp: 90 capsule, Rfl: 2    estradioL (ESTRACE) 2 MG tablet, Take 1 tablet (2 mg total) by mouth once daily., Disp: 90 tablet, Rfl: 0    loteprednol (LOTEMAX) 0.5 % ophthalmic suspension, Place 1 drop into both eyes., Disp: , Rfl:     methocarbamoL (ROBAXIN) 750 MG Tab, Take 1 tablet (750 mg total) by mouth 3 (three) times daily., Disp: 90 tablet, Rfl: 2    montelukast (SINGULAIR) 10 mg tablet, Take 1 tablet (10 mg total) by mouth once daily., Disp: 30 tablet, Rfl: 2    prednisoLONE sodium phosphate (INFLAMASE FORTE) 1 % Drop, 1 drop., Disp: , Rfl:     propranoloL (INDERAL [place]) 160 mg 24 hr capsule, Take 1 capsule (160 mg total) by mouth once daily., Disp: 90 capsule, Rfl: 3    tretinoin (RETIN-A) 0.1 % cream, Apply topically every evening., Disp: 45 g, Rfl: 3  Current medications Reviewed    Review of Systems   Constitutional:  Negative for activity change, appetite change, fatigue and fever.   HENT:  Negative for nosebleeds.    Respiratory:  Negative for cough and shortness of breath.    Cardiovascular:  Negative for chest pain, palpitations and leg swelling.   Gastrointestinal:  Negative for abdominal distention, abdominal pain and nausea.   Genitourinary:  Negative for frequency.   Musculoskeletal:  Negative for arthralgias and myalgias.   Skin:  Negative for rash.   Neurological:  Negative for dizziness and numbness.   Hematological:  Does not bruise/bleed easily.     Objective:   Physical Exam  HENT:      Head: Normocephalic and atraumatic.   Eyes:      Extraocular Movements: Extraocular movements intact.   Cardiovascular:      Rate and Rhythm: Normal rate and regular rhythm.   Pulmonary:      Effort: Pulmonary effort is normal.   Abdominal:      General: Abdomen is flat.      Palpations: Abdomen is soft.   Musculoskeletal:         General: Normal range of motion.      Cervical back: Normal range of motion.   Skin:     General: Skin is warm and dry.      Capillary  Refill: Capillary refill takes less than 2 seconds.   Neurological:      General: No focal deficit present.         Diagnotic Results: Reviewed  CT Chest: 6/2024  Images reviewed; no official radiology report available however TAA independently measured stable at 4.2 cm.     CT Chest: 5/2023  The neck soft tissues are normal.  The trachea and large airways are   patent.  The lungs are clear.  The heart is normal.  Coronary artery   calcifications are present.  Ascending aortic aneurysm measuring 4.1 x   4.1 cm.  No mediastinal adenopathy.  Bilateral breast implants with   capsular calcifications.   The liver is probably steatotic.  Small hypoattenuating foci are too   small to characterize but statistically benign.  Partially imaged cervical fusion hardware.  No aggressive osseous   lesions.   Assessment:   TAA  Plan:   Follow up in one year with a CT chest.  If this CT measurement remains the same, it will be the patients last appointment for TAA surveillance

## 2024-06-07 ENCOUNTER — TELEPHONE (OUTPATIENT)
Dept: CARDIOTHORACIC SURGERY | Facility: CLINIC | Age: 69
End: 2024-06-07
Payer: COMMERCIAL

## 2024-06-07 ENCOUNTER — PATIENT MESSAGE (OUTPATIENT)
Dept: CARDIOTHORACIC SURGERY | Facility: CLINIC | Age: 69
End: 2024-06-07
Payer: COMMERCIAL

## 2024-06-10 ENCOUNTER — HOSPITAL ENCOUNTER (OUTPATIENT)
Dept: RADIOLOGY | Facility: HOSPITAL | Age: 69
Discharge: HOME | End: 2024-06-10
Attending: THORACIC SURGERY (CARDIOTHORACIC VASCULAR SURGERY)
Payer: COMMERCIAL

## 2024-06-10 ENCOUNTER — HOSPITAL ENCOUNTER (OUTPATIENT)
Dept: CARDIOLOGY | Facility: HOSPITAL | Age: 69
Discharge: HOME | End: 2024-06-10
Attending: THORACIC SURGERY (CARDIOTHORACIC VASCULAR SURGERY)
Payer: COMMERCIAL

## 2024-06-10 ENCOUNTER — OFFICE VISIT (OUTPATIENT)
Dept: CARDIOTHORACIC SURGERY | Facility: CLINIC | Age: 69
End: 2024-06-10
Payer: COMMERCIAL

## 2024-06-10 VITALS
WEIGHT: 165 LBS | OXYGEN SATURATION: 98 % | HEART RATE: 63 BPM | DIASTOLIC BLOOD PRESSURE: 82 MMHG | WEIGHT: 168.88 LBS | DIASTOLIC BLOOD PRESSURE: 76 MMHG | HEIGHT: 64 IN | BODY MASS INDEX: 28.17 KG/M2 | HEART RATE: 65 BPM | SYSTOLIC BLOOD PRESSURE: 126 MMHG | SYSTOLIC BLOOD PRESSURE: 135 MMHG | BODY MASS INDEX: 28.83 KG/M2 | HEIGHT: 64 IN

## 2024-06-10 DIAGNOSIS — I71.21 ANEURYSM OF ASCENDING AORTA WITHOUT RUPTURE: ICD-10-CM

## 2024-06-10 DIAGNOSIS — I71.21 ANEURYSM OF ASCENDING AORTA WITHOUT RUPTURE: Primary | ICD-10-CM

## 2024-06-10 LAB
ASCENDING AORTA: 3.79 CM
AV INDEX (PROSTH): 0.96
AV MEAN GRADIENT: 2 MMHG
AV PEAK GRADIENT: 4 MMHG
AV VALVE AREA BY VELOCITY RATIO: 3.22 CM²
AV VALVE AREA: 3.53 CM²
AV VELOCITY RATIO: 0.88
BSA FOR ECHO PROCEDURE: 1.84 M2
CV ECHO LV RWT: 0.41 CM
DOP CALC AO PEAK VEL: 0.99 M/S
DOP CALC AO VTI: 20.06 CM
DOP CALC LVOT AREA: 3.7 CM2
DOP CALC LVOT DIAMETER: 2.16 CM
DOP CALC LVOT PEAK VEL: 0.87 M/S
DOP CALC LVOT STROKE VOLUME: 70.8 CM3
DOP CALCLVOT PEAK VEL VTI: 19.33 CM
E WAVE DECELERATION TIME: 233.59 MSEC
E/A RATIO: 1.18
E/E' RATIO: 10.4 M/S
ECHO LV POSTERIOR WALL: 0.84 CM (ref 0.6–1.1)
EJECTION FRACTION: 65 %
FRACTIONAL SHORTENING: 23 % (ref 28–44)
INTERVENTRICULAR SEPTUM: 0.94 CM (ref 0.6–1.1)
LA MAJOR: 4.64 CM
LA MINOR: 4.97 CM
LA WIDTH: 3.5 CM
LEFT ATRIUM SIZE: 3.43 CM
LEFT ATRIUM VOLUME INDEX MOD: 14.1 ML/M2
LEFT ATRIUM VOLUME INDEX: 27.2 ML/M2
LEFT ATRIUM VOLUME MOD: 25.43 CM3
LEFT ATRIUM VOLUME: 48.97 CM3
LEFT INTERNAL DIMENSION IN SYSTOLE: 3.16 CM (ref 2.1–4)
LEFT VENTRICLE DIASTOLIC VOLUME INDEX: 41.42 ML/M2
LEFT VENTRICLE DIASTOLIC VOLUME: 74.55 ML
LEFT VENTRICLE MASS INDEX: 63 G/M2
LEFT VENTRICLE SYSTOLIC VOLUME INDEX: 22 ML/M2
LEFT VENTRICLE SYSTOLIC VOLUME: 39.66 ML
LEFT VENTRICULAR INTERNAL DIMENSION IN DIASTOLE: 4.11 CM (ref 3.5–6)
LEFT VENTRICULAR MASS: 112.85 G
LV LATERAL E/E' RATIO: 9.75 M/S
LV SEPTAL E/E' RATIO: 11.14 M/S
MV PEAK A VEL: 0.66 M/S
MV PEAK E VEL: 0.78 M/S
MV STENOSIS PRESSURE HALF TIME: 67.74 MS
MV VALVE AREA P 1/2 METHOD: 3.25 CM2
OHS CV RV/LV RATIO: 0.9 CM
PISA TR MAX VEL: 2.48 M/S
RA MAJOR: 3.9 CM
RA PRESSURE ESTIMATED: 3 MMHG
RA WIDTH: 3.19 CM
RIGHT VENTRICULAR END-DIASTOLIC DIMENSION: 3.68 CM
RV TB RVSP: 5 MMHG
SINUS: 3.29 CM
STJ: 3.11 CM
TDI LATERAL: 0.08 M/S
TDI SEPTAL: 0.07 M/S
TDI: 0.08 M/S
TR MAX PG: 25 MMHG
TRICUSPID ANNULAR PLANE SYSTOLIC EXCURSION: 2.42 CM
TV REST PULMONARY ARTERY PRESSURE: 28 MMHG
Z-SCORE OF LEFT VENTRICULAR DIMENSION IN END DIASTOLE: -1.91
Z-SCORE OF LEFT VENTRICULAR DIMENSION IN END SYSTOLE: 0.21

## 2024-06-10 PROCEDURE — 71250 CT THORAX DX C-: CPT | Mod: TC

## 2024-06-10 PROCEDURE — 3075F SYST BP GE 130 - 139MM HG: CPT | Mod: CPTII,S$GLB,, | Performed by: THORACIC SURGERY (CARDIOTHORACIC VASCULAR SURGERY)

## 2024-06-10 PROCEDURE — 3008F BODY MASS INDEX DOCD: CPT | Mod: CPTII,S$GLB,, | Performed by: THORACIC SURGERY (CARDIOTHORACIC VASCULAR SURGERY)

## 2024-06-10 PROCEDURE — 93306 TTE W/DOPPLER COMPLETE: CPT | Mod: 26,,, | Performed by: INTERNAL MEDICINE

## 2024-06-10 PROCEDURE — 3288F FALL RISK ASSESSMENT DOCD: CPT | Mod: CPTII,S$GLB,, | Performed by: THORACIC SURGERY (CARDIOTHORACIC VASCULAR SURGERY)

## 2024-06-10 PROCEDURE — 99999 PR PBB SHADOW E&M-EST. PATIENT-LVL III: CPT | Mod: PBBFAC,,, | Performed by: THORACIC SURGERY (CARDIOTHORACIC VASCULAR SURGERY)

## 2024-06-10 PROCEDURE — 3078F DIAST BP <80 MM HG: CPT | Mod: CPTII,S$GLB,, | Performed by: THORACIC SURGERY (CARDIOTHORACIC VASCULAR SURGERY)

## 2024-06-10 PROCEDURE — 1101F PT FALLS ASSESS-DOCD LE1/YR: CPT | Mod: CPTII,S$GLB,, | Performed by: THORACIC SURGERY (CARDIOTHORACIC VASCULAR SURGERY)

## 2024-06-10 PROCEDURE — 99215 OFFICE O/P EST HI 40 MIN: CPT | Mod: S$GLB,,, | Performed by: THORACIC SURGERY (CARDIOTHORACIC VASCULAR SURGERY)

## 2024-06-10 PROCEDURE — 93306 TTE W/DOPPLER COMPLETE: CPT

## 2024-06-10 PROCEDURE — 1160F RVW MEDS BY RX/DR IN RCRD: CPT | Mod: CPTII,S$GLB,, | Performed by: THORACIC SURGERY (CARDIOTHORACIC VASCULAR SURGERY)

## 2024-06-10 PROCEDURE — 71250 CT THORAX DX C-: CPT | Mod: 26,,, | Performed by: RADIOLOGY

## 2024-06-10 PROCEDURE — 1159F MED LIST DOCD IN RCRD: CPT | Mod: CPTII,S$GLB,, | Performed by: THORACIC SURGERY (CARDIOTHORACIC VASCULAR SURGERY)

## 2024-06-10 RX ORDER — LATANOPROST 50 UG/ML
SOLUTION/ DROPS OPHTHALMIC
COMMUNITY
Start: 2024-03-27

## 2024-06-10 RX ORDER — TOFACITINIB 11 MG/1
11 TABLET, FILM COATED, EXTENDED RELEASE ORAL
COMMUNITY
Start: 2023-09-25 | End: 2024-09-24

## 2024-06-10 RX ORDER — DOXYCYCLINE HYCLATE 100 MG
100 TABLET ORAL 2 TIMES DAILY
COMMUNITY
Start: 2024-01-03

## 2024-06-10 RX ORDER — PREDNISONE 10 MG/1
TABLET ORAL
COMMUNITY
Start: 2024-01-22

## 2024-06-10 RX ORDER — PREDNISONE 20 MG/1
TABLET ORAL
COMMUNITY
Start: 2024-01-11

## 2024-06-10 RX ORDER — NEOMYCIN SULFATE, POLYMYXIN B SULFATE AND DEXAMETHASONE 3.5; 10000; 1 MG/ML; [USP'U]/ML; MG/ML
SUSPENSION/ DROPS OPHTHALMIC
COMMUNITY
Start: 2024-01-03

## 2024-07-10 NOTE — TELEPHONE ENCOUNTER
Called and confirmed pt's appts on 6/10 with pt, including appt times and locations, which she verbalized understanding to.  
Detail Level: Zone

## 2024-10-01 PROBLEM — I73.9 CLAUDICATION OF BOTH LOWER EXTREMITIES: Status: ACTIVE | Noted: 2024-10-01

## 2025-04-09 PROBLEM — E66.9 OBESITY: Status: ACTIVE | Noted: 2025-04-09

## 2025-05-14 ENCOUNTER — TELEPHONE (OUTPATIENT)
Dept: CARDIOTHORACIC SURGERY | Facility: CLINIC | Age: 70
End: 2025-05-14
Payer: COMMERCIAL

## 2025-05-14 DIAGNOSIS — I71.21 ANEURYSM OF ASCENDING AORTA WITHOUT RUPTURE: Primary | ICD-10-CM

## 2025-06-02 ENCOUNTER — TELEPHONE (OUTPATIENT)
Dept: CARDIOTHORACIC SURGERY | Facility: CLINIC | Age: 70
End: 2025-06-02
Payer: COMMERCIAL

## 2025-06-06 ENCOUNTER — PATIENT MESSAGE (OUTPATIENT)
Dept: CARDIOTHORACIC SURGERY | Facility: CLINIC | Age: 70
End: 2025-06-06
Payer: COMMERCIAL

## 2025-06-06 ENCOUNTER — TELEPHONE (OUTPATIENT)
Dept: CARDIOTHORACIC SURGERY | Facility: CLINIC | Age: 70
End: 2025-06-06
Payer: COMMERCIAL

## 2025-06-13 ENCOUNTER — TELEPHONE (OUTPATIENT)
Dept: CARDIOTHORACIC SURGERY | Facility: CLINIC | Age: 70
End: 2025-06-13
Payer: COMMERCIAL

## 2025-06-13 NOTE — TELEPHONE ENCOUNTER
Called pt to schedule follow-up with Dr. Gregg. No answer, LVM with reason for call and requested call back. Contact number provided.

## 2025-07-24 ENCOUNTER — TELEPHONE (OUTPATIENT)
Dept: CARDIOTHORACIC SURGERY | Facility: CLINIC | Age: 70
End: 2025-07-24
Payer: COMMERCIAL